# Patient Record
Sex: MALE | URBAN - METROPOLITAN AREA
[De-identification: names, ages, dates, MRNs, and addresses within clinical notes are randomized per-mention and may not be internally consistent; named-entity substitution may affect disease eponyms.]

---

## 2023-04-11 ENCOUNTER — OFFICE VISIT (OUTPATIENT)
Facility: LOCATION | Age: 48
End: 2023-04-11
Payer: COMMERCIAL

## 2023-04-11 DIAGNOSIS — H11.051 PERIPHERAL PTERYGIUM, PROGRESSIVE, RIGHT EYE: Primary | ICD-10-CM

## 2023-04-11 PROCEDURE — 92004 COMPRE OPH EXAM NEW PT 1/>: CPT | Performed by: OPTOMETRIST

## 2023-04-11 RX ORDER — PREDNISOLONE ACETATE 10 MG/ML
1 % SUSPENSION/ DROPS OPHTHALMIC
Qty: 5 | Refills: 0 | Status: ACTIVE
Start: 2023-04-11

## 2023-04-11 ASSESSMENT — VISUAL ACUITY
OD: 20/20
OS: 20/20

## 2023-04-11 ASSESSMENT — INTRAOCULAR PRESSURE
OD: 14
OS: 14

## 2023-04-11 ASSESSMENT — KERATOMETRY
OS: 40.88
OD: 40.88

## 2023-05-04 ENCOUNTER — OFFICE VISIT (OUTPATIENT)
Facility: LOCATION | Age: 48
End: 2023-05-04
Payer: COMMERCIAL

## 2023-05-04 DIAGNOSIS — H11.051 PERIPHERAL PTERYGIUM, PROGRESSIVE, RIGHT EYE: Primary | ICD-10-CM

## 2023-05-04 PROCEDURE — 92025 CPTRIZED CORNEAL TOPOGRAPHY: CPT | Performed by: OPTOMETRIST

## 2023-05-04 PROCEDURE — 99213 OFFICE O/P EST LOW 20 MIN: CPT | Performed by: OPTOMETRIST

## 2023-05-04 ASSESSMENT — INTRAOCULAR PRESSURE
OD: 16
OS: 13

## 2023-05-04 NOTE — IMPRESSION/PLAN
Impression: Peripheral pterygium, progressive, right eye: H11.051. Plan: Recurrent inflammation. Patient advised of all ocular findings. Topography performed and evaluated at today's examination- Abnormal findings OU. Discussed the importance of frequent lubrication and use of UV protection when outside or in the car. Advised patient to d/c Prednisolone and began artificial tears PRN. Discussed treatment options such as surgical intervention vs monitoring. Patient prefers surgical intervention. Will refer patient to occuloplastics for further evaluation and treatment. RTC: Next available pterygium evaluation with Dr. Ramakrishna Simon. 

Topography (05/04/2023): OD: Good- nasal flattening; OS: Good- irregular tearfilm

## 2023-07-13 ENCOUNTER — OFFICE VISIT (OUTPATIENT)
Dept: URBAN - METROPOLITAN AREA CLINIC 32 | Facility: CLINIC | Age: 48
End: 2023-07-13
Payer: COMMERCIAL

## 2023-07-13 DIAGNOSIS — H11.051 PERIPHERAL PTERYGIUM, PROGRESSIVE, RIGHT EYE: Primary | ICD-10-CM

## 2023-07-13 PROCEDURE — 99204 OFFICE O/P NEW MOD 45 MIN: CPT | Performed by: OPHTHALMOLOGY

## 2023-07-13 ASSESSMENT — INTRAOCULAR PRESSURE
OS: 11
OD: 11

## 2023-07-13 NOTE — IMPRESSION/PLAN
Impression: Peripheral pterygium, progressive, right eye: H11.051. Right. Condition: established, stable. Symptoms: may improve with surgery. Vision: vision threatening. Plan: Discussed diagnosis with patient. Discussed treatment options. Discussed risks, benefits, alternatives to surgery. Patient elects surgical treatment. Recommend Right Pterygium Excision with Graft and MMC.  RL2.

## 2023-08-14 ENCOUNTER — POST-OPERATIVE VISIT (OUTPATIENT)
Facility: LOCATION | Age: 48
End: 2023-08-14
Payer: COMMERCIAL

## 2023-08-14 DIAGNOSIS — Z48.810 ENCOUNTER FOR SURGICAL AFTERCARE FOLLOWING SURGERY ON A SENSE ORGAN: Primary | ICD-10-CM

## 2023-08-14 PROCEDURE — 99024 POSTOP FOLLOW-UP VISIT: CPT | Performed by: OPTOMETRIST

## 2023-08-14 ASSESSMENT — INTRAOCULAR PRESSURE
OD: 15
OS: 15

## 2023-08-24 ENCOUNTER — POST-OPERATIVE VISIT (OUTPATIENT)
Dept: URBAN - METROPOLITAN AREA CLINIC 32 | Facility: CLINIC | Age: 48
End: 2023-08-24
Payer: COMMERCIAL

## 2023-08-24 DIAGNOSIS — Z48.89 ENCOUNTER FOR OTHER SPECIFIED SURGICAL AFTERCARE: Primary | ICD-10-CM

## 2023-08-24 PROCEDURE — 99024 POSTOP FOLLOW-UP VISIT: CPT | Performed by: OPHTHALMOLOGY

## 2023-08-24 ASSESSMENT — INTRAOCULAR PRESSURE
OD: 19
OS: 13

## 2023-09-21 ENCOUNTER — POST-OPERATIVE VISIT (OUTPATIENT)
Dept: URBAN - METROPOLITAN AREA CLINIC 32 | Facility: CLINIC | Age: 48
End: 2023-09-21
Payer: COMMERCIAL

## 2023-09-21 DIAGNOSIS — Z48.89 ENCOUNTER FOR OTHER SPECIFIED SURGICAL AFTERCARE: Primary | ICD-10-CM

## 2023-09-21 PROCEDURE — 99024 POSTOP FOLLOW-UP VISIT: CPT | Performed by: OPHTHALMOLOGY

## 2023-09-21 ASSESSMENT — INTRAOCULAR PRESSURE
OS: 13
OD: 17

## 2024-09-09 NOTE — IMPRESSION/PLAN
Impression: Peripheral pterygium, progressive, right eye: H11.051. Plan: Patient advised of all ocular findings. Discussed the importance of frequent lubrication and use of UV protection when outside or in the car. Discussed treatment options such as surgical intervention vs monitoring. Patient interested in surgical intervention. Will start Prednisolone BID OD until next apt and order JAN. Consider referral to Dr. Malina Del Cid after next visit. No (0)